# Patient Record
Sex: MALE | URBAN - METROPOLITAN AREA
[De-identification: names, ages, dates, MRNs, and addresses within clinical notes are randomized per-mention and may not be internally consistent; named-entity substitution may affect disease eponyms.]

---

## 2018-05-22 ENCOUNTER — THERAPY VISIT (OUTPATIENT)
Dept: PHYSICAL THERAPY | Facility: CLINIC | Age: 28
End: 2018-05-22
Payer: COMMERCIAL

## 2018-05-22 DIAGNOSIS — M25.562 CHRONIC PAIN OF BOTH KNEES: Primary | ICD-10-CM

## 2018-05-22 DIAGNOSIS — G89.29 CHRONIC PAIN OF BOTH KNEES: Primary | ICD-10-CM

## 2018-05-22 DIAGNOSIS — M25.561 CHRONIC PAIN OF BOTH KNEES: Primary | ICD-10-CM

## 2018-05-22 PROCEDURE — 97161 PT EVAL LOW COMPLEX 20 MIN: CPT | Mod: GP | Performed by: PHYSICAL THERAPIST

## 2018-05-22 PROCEDURE — 97112 NEUROMUSCULAR REEDUCATION: CPT | Mod: GP | Performed by: PHYSICAL THERAPIST

## 2018-05-22 PROCEDURE — 97110 THERAPEUTIC EXERCISES: CPT | Mod: GP | Performed by: PHYSICAL THERAPIST

## 2018-05-22 ASSESSMENT — ACTIVITIES OF DAILY LIVING (ADL)
WEAKNESS: I DO NOT HAVE THE SYMPTOM
GO UP STAIRS: NOT ANSWERED
KNEEL ON THE FRONT OF YOUR KNEE: NOT ANSWERED
STIFFNESS: I DO NOT HAVE THE SYMPTOM
GIVING WAY, BUCKLING OR SHIFTING OF KNEE: THE SYMPTOM AFFECTS MY ACTIVITY SLIGHTLY
AS_A_RESULT_OF_YOUR_KNEE_INJURY,_HOW_WOULD_YOU_RATE_YOUR_CURRENT_LEVEL_OF_DAILY_ACTIVITY?: NOT ANSWERED
KNEE_ACTIVITY_OF_DAILY_LIVING_SUM: 24
HOW_WOULD_YOU_RATE_THE_OVERALL_FUNCTION_OF_YOUR_KNEE_DURING_YOUR_USUAL_DAILY_ACTIVITIES?: NOT ANSWERED
STAND: NOT ANSWERED
PAIN: THE SYMPTOM AFFECTS MY ACTIVITY MODERATELY
LIMPING: I DO NOT HAVE THE SYMPTOM
WALK: NOT ANSWERED
GO DOWN STAIRS: NOT ANSWERED
SIT WITH YOUR KNEE BENT: NOT ANSWERED
SWELLING: I DO NOT HAVE THE SYMPTOM
RISE FROM A CHAIR: NOT ANSWERED
SQUAT: NOT ANSWERED

## 2018-05-22 NOTE — PROGRESS NOTES
[facility] Initial Evaluation -- Lower Extremity    Evaluation Date: May 22, 2018  Deandre Saenz is a 28 year old male with a bilat knees condition.   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  Has stopped running d/t knee pain.  Functional disability score: See flowsheet  VAS score (0-10): 0/10, 8-9/10 at worst - 3/10 often  Patient goals/expectations:  decr pain on stairs and w/ yardwork    HISTORY:    Present symptoms: superior lateral knees L>R.  No radiation  Pain quality (sharp/shooting/stabbing/aching/burning/cramping):  sharp    Present since (onset date): Long history of pain 10 yrs - tired of pain and restrictions so talked to MD.  MD referral 5-  Symptoms (improving/unchaning/worsening):  unchanging.      Symptoms commenced as a result of: unknown   Condition occurred in the following environment: unknown     Symptoms at onset: same as above  Paresthesia (yes/no):  none  Spinal history: yes - 2 prev PT episodes - strengthening   Cough/Sneeze (pos/neg):  no    Constant symptoms: none  Intermittent symptoms: bilat knees    Symptoms are worse with the following: Always Stairs, Always Squatting and Time of day - No effect   Symptoms are better with the following: Other - stops activities    Continued use makes the pain (better/worse/no effect): no effect    Disturbed night (yes/no): no      Pain at rest (yes/no):  no  Site (back/hip/knee/ankle/foot):      Other questions (swelling/clicking/locking/giving way/falling):  clicking     Previous episodes: long Hx of knee pain - see above  Previous treatments: none for knees    Specific Questions:  General health (excellent/good/fair/poor):  good  Pertinent medical history includes: None  Medications  (nil/NSAIDS/analg/steroids/anticoag/other):  None  Medical allergies:  none  Imaging (none/Xray/MRI/other):  none  Recent or major surgery (yes/no):  none  Night pain (yes/no):  no  Accidents (yes/no):  no  Unexplained weight loss (yes/no):  no  Barriers at home: none  Other red flags: none    Sites for physical examination (back/hip/knee/ankle/foot/other): Bilat knees    EXAMINATION    Posture:  Sitting (good/fair/poor): fair - sits majority of day    Correction of Posture (better/worse/no effect/NA): NE  Standing (good/fair/poor): good - decr lordosis, toes out Rt  Other observations:      Neurological: (NA/motor/sensory/reflexes/dural):    Myotomes normal   Dural:  Mod-severe tightness bilat w/ slump    Baselines (pain or functional activity): PDM while squatting    Extremities (Hip / Knee / Ankle / Foot): Bilat knees   Bilat knee Active Flexion and ext WNL and painfree    Passive Movement (+/- over pressure)/(PDM/ERP):     Passive knee flexion WNL and painfree bilat   Passive knee extension slight decr bilat, painfree  Resisted Test Response (pain):    Bilat quad and HS 5/5   Single leg squat symmetrical, mild pain L>R   Single leg toe raise symmetrical  Other Tests:     Spine:  Movement loss:    Trunk AROM:  Flexion WNL     Extension WNL     SG R & L WNL  Effect of repeated movements:    EIL (press up) 2 x10 - NE, NE (sore LB), NE ROM, decr pain w/ squat and step down  Effect of static positioning: na  Spine testing (not relevant/relevant/secondary problem): relevant    Baseline Symptoms: NT - ruling out lumbar  Repeated Tests Symptom Response Mechanical Response   Active/Passive movement, resisted test, functional test During -  Produce, Abolish, Increase, Decrease, NE After -  Better, Worse, NB, NW, NE Effect -   ? or ? ROM, strength or key functional test No   Effect          Effect of static positioning                Provisional Classification (Extremity/Spine):  Spine - Derangement - Bilateral,  symmetrical, symptoms above knee      Princicple of Management:   Education:  Discussed association of LB to knee pain.  Posture instr - Neutral spine, use of L-roll, affect of posture on LB.  No recliner or couch.  Centralisation vs peripheralisation.  HEP  Equipment provided:  none  Exercise and dosage:  Press up 10x 6 x/day    ASSESSMENT/PLAN:    Patient is a 28 year old male with lumbar and both sides knee complaints.    Patient has the following significant findings with corresponding treatment plan.                Diagnosis 1:  Bilateral Knee pain  Pain -  manual therapy, education, directional preference exercise and home program  Decreased ROM/flexibility - manual therapy, therapeutic exercise and home program  Decreased function - therapeutic activities and home program  Impaired posture - neuro re-education and home program    Therapy Evaluation Codes:   1) History comprised of:   Personal factors that impact the plan of care:      None.    Comorbidity factors that impact the plan of care are:      None.     Medications impacting care: None.  2) Examination of Body Systems comprised of:   Body structures and functions that impact the plan of care:      Knee and Lumbar spine.   Activity limitations that impact the plan of care are:      Squatting/kneeling and Stairs.  3) Clinical presentation characteristics are:   Stable/Uncomplicated.  4) Decision-Making    Low complexity using standardized patient assessment instrument and/or measureable assessment of functional outcome.  Cumulative Therapy Evaluation is: Low complexity.    Previous and current functional limitations:  (See Goal Flow Sheet for this information)    Short term and Long term goals: (See Goal Flow Sheet for this information)     Communication ability:  Patient appears to be able to clearly communicate and understand verbal and written communication and follow directions correctly.  Treatment Explanation - The following has been discussed  with the patient:   RX ordered/plan of care  Anticipated outcomes  Possible risks and side effects  This patient would benefit from PT intervention to resume normal activities.   Rehab potential is good.    Frequency:  1 X week, once daily  Duration:  for 6 weeks  Discharge Plan:  Achieve all LTG.  Independent in home treatment program.  Reach maximal therapeutic benefit.    Please refer to the daily flowsheet for treatment today, total treatment time and time spent performing 1:1 timed codes.

## 2018-05-22 NOTE — LETTER
[facility]  [address]   [place] [address]  [phone]    May 29, 2018    Re: Deandre Saenz   :   1990  MRN:  [id number]   REFERRING PHYSICIAN:   Martha Samayoa    [facility]    Date of Initial Evaluation: 2018  Visits:  Rxs Used: 1  Reason for Referral:  Chronic pain of both knees    EVALUATION SUMMARY    [facility] Initial Evaluation -- Lower Extremity    Evaluation Date: May 22, 2018  Deandre Saenz is a 28 year old male with a bilat knees condition.   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  Has stopped running d/t knee pain.  Functional disability score: See flowsheet  VAS score (0-10): 0/10, 8-9/10 at worst - 3/10 often  Patient goals/expectations:  decr pain on stairs and w/ yardwork    HISTORY:    Present symptoms: superior lateral knees L>R.  No radiation  Pain quality (sharp/shooting/stabbing/aching/burning/cramping):  sharp  Present since (onset date): Long history of pain 10 yrs - tired of pain and restrictions so talked to MD.  MD referral 5-  Symptoms (improving/unchaning/worsening):  unchanging.    Symptoms commenced as a result of: unknown   Condition occurred in the following environment: unknown   Symptoms at onset: same as above  Paresthesia (yes/no):  none  Spinal history: yes - 2 prev PT episodes - strengthening   Cough/Sneeze (pos/neg):  no  Constant symptoms: none  Intermittent symptoms: bilat knees  Symptoms are worse with the following: Always Stairs, Always Squatting and Time of day - No effect   Symptoms are better with the following: Other - stops activities  Continued use makes the pain (better/worse/no  effect): no effect  Disturbed night (yes/no): no    Pain at rest (yes/no):  no  Site (back/hip/knee/ankle/foot):    Other questions (swelling/clicking/locking/giving way/falling):  clicking  Previous episodes: long Hx of knee pain - see above  Previous treatments: none for knees    Specific Questions:  General health (excellent/good/fair/poor):  good  Pertinent medical history includes: None  Medications (nil/NSAIDS/analg/steroids/anticoag/other):  None  Medical allergies:  none  Imaging (none/Xray/MRI/other):  none  Recent or major surgery (yes/no):  none  Night pain (yes/no):  no  Accidents (yes/no):  no  Unexplained weight loss (yes/no):  no  Barriers at home: none  Other red flags: none    Sites for physical examination (back/hip/knee/ankle/foot/other): Bilat knees  EXAMINATION    Posture:  Sitting (good/fair/poor): fair - sits majority of day    Correction of Posture (better/worse/no effect/NA): NE  Standing (good/fair/poor): good - decr lordosis, toes out Rt  Other observations:      Neurological: (NA/motor/sensory/reflexes/dural):    Myotomes normal   Dural:  Mod-severe tightness bilat w/ slump    Baselines (pain or functional activity): PDM while squatting    Extremities (Hip / Knee / Ankle / Foot): Bilat knees   Bilat knee Active Flexion and ext WNL and painfree    Passive Movement (+/- over pressure)/(PDM/ERP):     Passive knee flexion WNL and painfree bilat   Passive knee extension slight decr bilat, painfree  Resisted Test Response (pain):    Bilat quad and HS 5/5   Single leg squat symmetrical, mild pain L>R   Single leg toe raise symmetrical  Other Tests:     Spine:  Movement loss:    Trunk AROM:  Flexion WNL     Extension WNL     SG R & L WNL  Effect of repeated movements:    EIL (press up) 2 x10 - NE, NE (sore LB), NE ROM, decr pain w/ squat and step down  Effect of static positioning: na  Spine testing (not relevant/relevant/secondary problem): relevant    Baseline Symptoms: NT - ruling out  lumbar  Repeated Tests Symptom Response Mechanical Response   Active/Passive movement, resisted test, functional test During -  Produce, Abolish, Increase, Decrease, NE After -  Better, Worse, NB, NW, NE Effect -   ? or ? ROM, strength or key functional test No   Effect          Effect of static positioning                Provisional Classification (Extremity/Spine):  Spine - Derangement - Bilateral, symmetrical, symptoms above knee    Princicple of Management:   Education:  Discussed association of LB to knee pain.  Posture instr - Neutral spine, use of L-roll, affect of posture on LB.  No recliner or couch.  Centralisation vs peripheralisation.  HEP  Equipment provided:  none  Exercise and dosage:  Press up 10x 6 x/day  ASSESSMENT/PLAN:  Patient is a 28 year old male with lumbar and both sides knee complaints.    Patient has the following significant findings with corresponding treatment plan.                Diagnosis 1:  Bilateral Knee pain  Pain -  manual therapy, education, directional preference exercise and home program  Decreased ROM/flexibility - manual therapy, therapeutic exercise and home program  Decreased function - therapeutic activities and home program  Impaired posture - neuro re-education and home program    Therapy Evaluation Codes:   1) History comprised of:   Personal factors that impact the plan of care:      None.    Comorbidity factors that impact the plan of care are:      None.     Medications impacting care: None.  2) Examination of Body Systems comprised of:   Body structures and functions that impact the plan of care:      Knee and Lumbar spine.   Activity limitations that impact the plan of care are:      Squatting/kneeling and Stairs.  3) Clinical presentation characteristics are:   Stable/Uncomplicated.  4) Decision-Making    Low complexity using standardized patient assessment instrument and/or measureable assessment of functional outcome.  Cumulative Therapy Evaluation is: Low  complexity.  Previous and current functional limitations:  (See Goal Flow Sheet for this information)    Short term and Long term goals: (See Goal Flow Sheet for this information)     Communication ability:  Patient appears to be able to clearly communicate and understand verbal and written communication and follow directions correctly.  Treatment Explanation - The following has been discussed with the patient:   RX ordered/plan of care. Anticipated outcomes. Possible risks and side effects.  This patient would benefit from PT intervention to resume normal activities. Rehab potential is good.    Frequency:  1 X week, once daily  Duration:  for 6 weeks  Discharge Plan:  Achieve all LTG.  Independent in home treatment program.  Reach maximal therapeutic benefit.    Please refer to the daily flowsheet for treatment today, total treatment time and time spent performing 1:1 timed codes.       Thank you for your referral.    INQUIRIES  Therapist: Joy Patel, PT, Cert, MDT  [facility]  [place] [facility]  [address] [facility] [address]   [place] [address]  Phone: [phone]  Fax: [phone]

## 2018-05-31 ENCOUNTER — THERAPY VISIT (OUTPATIENT)
Dept: PHYSICAL THERAPY | Facility: CLINIC | Age: 28
End: 2018-05-31
Payer: COMMERCIAL

## 2018-05-31 DIAGNOSIS — G89.29 CHRONIC PAIN OF BOTH KNEES: Primary | ICD-10-CM

## 2018-05-31 DIAGNOSIS — M25.562 CHRONIC PAIN OF BOTH KNEES: Primary | ICD-10-CM

## 2018-05-31 DIAGNOSIS — M25.561 CHRONIC PAIN OF BOTH KNEES: Primary | ICD-10-CM

## 2018-05-31 PROCEDURE — 97530 THERAPEUTIC ACTIVITIES: CPT | Mod: GP | Performed by: PHYSICAL THERAPIST

## 2018-05-31 PROCEDURE — 97110 THERAPEUTIC EXERCISES: CPT | Mod: GP | Performed by: PHYSICAL THERAPIST

## 2018-05-31 ASSESSMENT — ACTIVITIES OF DAILY LIVING (ADL)
STAND: ACTIVITY IS NOT DIFFICULT
WALK: ACTIVITY IS NOT DIFFICULT
LIMPING: I DO NOT HAVE THE SYMPTOM
RAW_SCORE: 61
AS_A_RESULT_OF_YOUR_KNEE_INJURY,_HOW_WOULD_YOU_RATE_YOUR_CURRENT_LEVEL_OF_DAILY_ACTIVITY?: ABNORMAL
KNEE_ACTIVITY_OF_DAILY_LIVING_SUM: 61
HOW_WOULD_YOU_RATE_THE_OVERALL_FUNCTION_OF_YOUR_KNEE_DURING_YOUR_USUAL_DAILY_ACTIVITIES?: ABNORMAL
STIFFNESS: I DO NOT HAVE THE SYMPTOM
PAIN: THE SYMPTOM AFFECTS MY ACTIVITY SLIGHTLY
KNEEL ON THE FRONT OF YOUR KNEE: ACTIVITY IS NOT DIFFICULT
SWELLING: I DO NOT HAVE THE SYMPTOM
SQUAT: ACTIVITY IS FAIRLY DIFFICULT
GO UP STAIRS: ACTIVITY IS MINIMALLY DIFFICULT
KNEE_ACTIVITY_OF_DAILY_LIVING_SCORE: 87.14
GIVING WAY, BUCKLING OR SHIFTING OF KNEE: I DO NOT HAVE THE SYMPTOM
SIT WITH YOUR KNEE BENT: ACTIVITY IS NOT DIFFICULT
WEAKNESS: THE SYMPTOM AFFECTS MY ACTIVITY SLIGHTLY
GO DOWN STAIRS: ACTIVITY IS MINIMALLY DIFFICULT
RISE FROM A CHAIR: ACTIVITY IS NOT DIFFICULT

## 2018-06-06 ENCOUNTER — THERAPY VISIT (OUTPATIENT)
Dept: PHYSICAL THERAPY | Facility: CLINIC | Age: 28
End: 2018-06-06
Payer: COMMERCIAL

## 2018-06-06 DIAGNOSIS — M25.561 CHRONIC PAIN OF BOTH KNEES: ICD-10-CM

## 2018-06-06 DIAGNOSIS — M25.562 CHRONIC PAIN OF BOTH KNEES: ICD-10-CM

## 2018-06-06 DIAGNOSIS — G89.29 CHRONIC PAIN OF BOTH KNEES: ICD-10-CM

## 2018-06-06 PROCEDURE — 97110 THERAPEUTIC EXERCISES: CPT | Mod: GP | Performed by: PHYSICAL THERAPIST

## 2018-06-06 PROCEDURE — 97530 THERAPEUTIC ACTIVITIES: CPT | Mod: GP | Performed by: PHYSICAL THERAPIST

## 2018-06-12 ENCOUNTER — THERAPY VISIT (OUTPATIENT)
Dept: PHYSICAL THERAPY | Facility: CLINIC | Age: 28
End: 2018-06-12
Payer: COMMERCIAL

## 2018-06-12 DIAGNOSIS — M25.562 CHRONIC PAIN OF BOTH KNEES: ICD-10-CM

## 2018-06-12 DIAGNOSIS — M25.561 CHRONIC PAIN OF BOTH KNEES: ICD-10-CM

## 2018-06-12 DIAGNOSIS — G89.29 CHRONIC PAIN OF BOTH KNEES: ICD-10-CM

## 2018-06-12 PROCEDURE — 97110 THERAPEUTIC EXERCISES: CPT | Mod: GP | Performed by: PHYSICAL THERAPIST

## 2018-06-12 PROCEDURE — 97530 THERAPEUTIC ACTIVITIES: CPT | Mod: GP | Performed by: PHYSICAL THERAPIST

## 2018-06-27 ENCOUNTER — THERAPY VISIT (OUTPATIENT)
Dept: PHYSICAL THERAPY | Facility: CLINIC | Age: 28
End: 2018-06-27
Payer: COMMERCIAL

## 2018-06-27 DIAGNOSIS — G89.29 CHRONIC PAIN OF BOTH KNEES: ICD-10-CM

## 2018-06-27 DIAGNOSIS — M25.562 CHRONIC PAIN OF BOTH KNEES: ICD-10-CM

## 2018-06-27 DIAGNOSIS — M25.561 CHRONIC PAIN OF BOTH KNEES: ICD-10-CM

## 2018-06-27 PROCEDURE — 97530 THERAPEUTIC ACTIVITIES: CPT | Mod: GP | Performed by: PHYSICAL THERAPIST

## 2018-06-27 PROCEDURE — 97110 THERAPEUTIC EXERCISES: CPT | Mod: GP | Performed by: PHYSICAL THERAPIST

## 2018-07-11 ENCOUNTER — THERAPY VISIT (OUTPATIENT)
Dept: PHYSICAL THERAPY | Facility: CLINIC | Age: 28
End: 2018-07-11
Payer: COMMERCIAL

## 2018-07-11 DIAGNOSIS — M25.562 CHRONIC PAIN OF BOTH KNEES: ICD-10-CM

## 2018-07-11 DIAGNOSIS — G89.29 CHRONIC PAIN OF BOTH KNEES: ICD-10-CM

## 2018-07-11 DIAGNOSIS — M25.561 CHRONIC PAIN OF BOTH KNEES: ICD-10-CM

## 2018-07-11 PROCEDURE — 97530 THERAPEUTIC ACTIVITIES: CPT | Mod: GP | Performed by: PHYSICAL THERAPIST

## 2018-07-11 PROCEDURE — 97110 THERAPEUTIC EXERCISES: CPT | Mod: GP | Performed by: PHYSICAL THERAPIST

## 2018-07-11 ASSESSMENT — ACTIVITIES OF DAILY LIVING (ADL)
LIMPING: I DO NOT HAVE THE SYMPTOM
STIFFNESS: I DO NOT HAVE THE SYMPTOM
AS_A_RESULT_OF_YOUR_KNEE_INJURY,_HOW_WOULD_YOU_RATE_YOUR_CURRENT_LEVEL_OF_DAILY_ACTIVITY?: NEARLY NORMAL
HOW_WOULD_YOU_RATE_THE_CURRENT_FUNCTION_OF_YOUR_KNEE_DURING_YOUR_USUAL_DAILY_ACTIVITIES_ON_A_SCALE_FROM_0_TO_100_WITH_100_BEING_YOUR_LEVEL_OF_KNEE_FUNCTION_PRIOR_TO_YOUR_INJURY_AND_0_BEING_THE_INABILITY_TO_PERFORM_ANY_OF_YOUR_USUAL_DAILY_ACTIVITIES?: 85
WALK: ACTIVITY IS NOT DIFFICULT
SWELLING: I DO NOT HAVE THE SYMPTOM
GO DOWN STAIRS: ACTIVITY IS NOT DIFFICULT
STAND: ACTIVITY IS NOT DIFFICULT
KNEE_ACTIVITY_OF_DAILY_LIVING_SUM: 66
PAIN: THE SYMPTOM AFFECTS MY ACTIVITY SLIGHTLY
RAW_SCORE: 66
RISE FROM A CHAIR: ACTIVITY IS NOT DIFFICULT
GIVING WAY, BUCKLING OR SHIFTING OF KNEE: I DO NOT HAVE THE SYMPTOM
GO UP STAIRS: ACTIVITY IS NOT DIFFICULT
WEAKNESS: I DO NOT HAVE THE SYMPTOM
KNEEL ON THE FRONT OF YOUR KNEE: ACTIVITY IS NOT DIFFICULT
SQUAT: ACTIVITY IS SOMEWHAT DIFFICULT
SIT WITH YOUR KNEE BENT: ACTIVITY IS NOT DIFFICULT
HOW_WOULD_YOU_RATE_THE_OVERALL_FUNCTION_OF_YOUR_KNEE_DURING_YOUR_USUAL_DAILY_ACTIVITIES?: NEARLY NORMAL
KNEE_ACTIVITY_OF_DAILY_LIVING_SCORE: 94.29

## 2018-07-19 NOTE — PROGRESS NOTES
"Subjective:  HPI       Knee Activity of Daily Living Score: 94.29            Objective:  System    Physical Exam    General     ROS    Assessment/Plan:    DISCHARGE REPORT    Progress reporting period is from 5- to 7-.       SUBJECTIVE  Subjective changes noted by patient:  Knees are better.  Stairs have been mostly painfree.  Chief c/o pain w/ deep squatting activities.    Current Pain level: 2/10 (when has pain).     Initial Pain level: 0/10 (ranges 0-9/10).   Changes in function:  Yes (See Goal flowsheet attached for changes in current functional level)  Adverse reaction to treatment or activity: None    OBJECTIVE  Changes noted in objective findings:  Yes, Incr trunk ROM and incr hip ROM.  Objective: Bilat knee AROM and strength are WNL  8\" Fwd step down - min pain Lt knee, no pain Rt.  90 squat - pain Lt knee.  Hip AROM:  flexion symmetrical, abduction slight decr Rt, IR mod decr Rt, ER min decr Bilat.  Trunk AROM WNL all movements.     ASSESSMENT/PLAN  Updated problem list and treatment plan: Diagnosis 1:  Bilateral knee pain  Pain -  home program  Decreased ROM/flexibility - home program  Decreased function - home program  STG/LTGs have been met or progress has been made towards goals:  Yes (See Goal flow sheet completed today.)  Assessment of Progress: The patient has met all of their long term goals.  Self Management Plans:  Patient is independent in a home treatment program.    Lars continues to require the following intervention to meet STG and LTG's:  PT intervention is no longer required to meet STG/LTG.    Recommendations:  This patient is ready to be discharged from therapy and continue their home treatment program.    Please refer to the daily flowsheet for treatment today, total treatment time and time spent performing 1:1 timed codes.            "